# Patient Record
Sex: FEMALE | Race: WHITE | NOT HISPANIC OR LATINO | Employment: OTHER | URBAN - METROPOLITAN AREA
[De-identification: names, ages, dates, MRNs, and addresses within clinical notes are randomized per-mention and may not be internally consistent; named-entity substitution may affect disease eponyms.]

---

## 2023-04-12 ENCOUNTER — TELEMEDICINE (OUTPATIENT)
Dept: PRIMARY CARE | Facility: CLINIC | Age: 83
End: 2023-04-12
Payer: MEDICARE

## 2023-04-12 DIAGNOSIS — L30.9 DERMATITIS: Primary | ICD-10-CM

## 2023-04-12 PROCEDURE — 99442 PR PHYS/QHP TELEPHONE EVALUATION 11-20 MIN: CPT | Performed by: INTERNAL MEDICINE

## 2023-04-12 RX ORDER — FAMOTIDINE 20 MG/1
20 TABLET, FILM COATED ORAL 2 TIMES DAILY
Qty: 14 TABLET | Refills: 1 | Status: SHIPPED | OUTPATIENT
Start: 2023-04-12 | End: 2023-05-05 | Stop reason: WASHOUT

## 2023-04-12 RX ORDER — METHYLPREDNISOLONE 4 MG/1
TABLET ORAL
Qty: 21 TABLET | Refills: 0 | Status: SHIPPED | OUTPATIENT
Start: 2023-04-12 | End: 2023-04-19

## 2023-04-12 RX ORDER — HYDROXYZINE HYDROCHLORIDE 10 MG/1
10 TABLET, FILM COATED ORAL DAILY
Qty: 7 TABLET | Refills: 1 | Status: SHIPPED | OUTPATIENT
Start: 2023-04-12 | End: 2023-05-05 | Stop reason: WASHOUT

## 2023-04-12 NOTE — PROGRESS NOTES
Subjective   Patient ID: Lorenza Mao is a 82 y.o. female who presents for No chief complaint on file..    HPI patient is attended by phone visit because she is complaining of rash which she noticed yesterday on her chest wall.  She denies any fever, tick bite, chills, palpitation and diaphoresis.  She has history of plantar wart of right foot,carotid artery disorder, right mandibular cavernous hemangioma, mild memory loss, allergic rhinitis, coronary atherosclerosis, hypertension, hypothyroidism, dyslipidemia, right frontal lobe meningioma measuring 7 mm, glaucoma, status post COVID-19 infection in November 2019, syncope,anxiety with mild depression, lumbar radiculitis, questionable for Ehler Danlos syndrome and osteoporosis . She is also here for     Review of Systems   Constitutional: Negative.    HENT: Negative.     Eyes: Negative.    Respiratory: Negative.     Cardiovascular: Negative.    Gastrointestinal: Negative.    Endocrine: Negative.    Genitourinary: Negative.    Musculoskeletal: Negative.    Skin:  Positive for rash.   Allergic/Immunologic: Negative.    Neurological: Negative.    Hematological: Negative.    Psychiatric/Behavioral: Negative.         Objective   There were no vitals taken for this visit.    Physical Examnot done    Assessment/Plan    patient is given trial of Medrol Dosepak famotidine and hydroxyzine regarding her rash.  She will continue other medications and will notify the clinic if she has no improvement in her symptoms.

## 2023-04-15 ASSESSMENT — ENCOUNTER SYMPTOMS
ENDOCRINE NEGATIVE: 1
RESPIRATORY NEGATIVE: 1
MUSCULOSKELETAL NEGATIVE: 1
CONSTITUTIONAL NEGATIVE: 1
HEMATOLOGIC/LYMPHATIC NEGATIVE: 1
NEUROLOGICAL NEGATIVE: 1
ALLERGIC/IMMUNOLOGIC NEGATIVE: 1
CARDIOVASCULAR NEGATIVE: 1
PSYCHIATRIC NEGATIVE: 1
GASTROINTESTINAL NEGATIVE: 1
EYES NEGATIVE: 1

## 2023-04-29 DIAGNOSIS — M81.8 ADULT IDIOPATHIC GENERALIZED OSTEOPOROSIS: Primary | ICD-10-CM

## 2023-04-29 DIAGNOSIS — G47.00 INSOMNIA, UNSPECIFIED TYPE: ICD-10-CM

## 2023-04-30 RX ORDER — TRAZODONE HYDROCHLORIDE 50 MG/1
TABLET ORAL
Qty: 90 TABLET | Refills: 0 | Status: SHIPPED | OUTPATIENT
Start: 2023-04-30 | End: 2023-05-05 | Stop reason: SDUPTHER

## 2023-04-30 RX ORDER — ALENDRONATE SODIUM 70 MG/1
TABLET ORAL
Qty: 12 TABLET | Refills: 0 | Status: SHIPPED | OUTPATIENT
Start: 2023-04-30 | End: 2023-09-28 | Stop reason: SDUPTHER

## 2023-05-04 DIAGNOSIS — F41.9 ANXIETY: Primary | ICD-10-CM

## 2023-05-04 RX ORDER — PAROXETINE HYDROCHLORIDE 20 MG/1
1 TABLET, FILM COATED ORAL DAILY
COMMUNITY
Start: 2018-10-25 | End: 2023-05-04 | Stop reason: SDUPTHER

## 2023-05-04 RX ORDER — PAROXETINE HYDROCHLORIDE 20 MG/1
20 TABLET, FILM COATED ORAL DAILY
Qty: 90 TABLET | Refills: 1 | Status: SHIPPED | OUTPATIENT
Start: 2023-05-04 | End: 2023-08-27 | Stop reason: SDUPTHER

## 2023-05-05 ENCOUNTER — OFFICE VISIT (OUTPATIENT)
Dept: PRIMARY CARE | Facility: CLINIC | Age: 83
End: 2023-05-05
Payer: MEDICARE

## 2023-05-05 VITALS
SYSTOLIC BLOOD PRESSURE: 155 MMHG | BODY MASS INDEX: 18.22 KG/M2 | DIASTOLIC BLOOD PRESSURE: 80 MMHG | OXYGEN SATURATION: 98 % | HEIGHT: 62 IN | WEIGHT: 99 LBS

## 2023-05-05 DIAGNOSIS — R07.81 RIB PAIN ON LEFT SIDE: Primary | ICD-10-CM

## 2023-05-05 DIAGNOSIS — G47.00 INSOMNIA, UNSPECIFIED TYPE: ICD-10-CM
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
## 2023-05-05 PROCEDURE — 99213 OFFICE O/P EST LOW 20 MIN: CPT | Performed by: INTERNAL MEDICINE

## 2023-05-05 RX ORDER — ROSUVASTATIN CALCIUM 20 MG/1
TABLET, COATED ORAL
COMMUNITY
Start: 2022-08-15 | End: 2023-12-11 | Stop reason: SDUPTHER

## 2023-05-05 RX ORDER — TRAZODONE HYDROCHLORIDE 50 MG/1
50 TABLET ORAL NIGHTLY
Qty: 90 TABLET | Refills: 1 | Status: SHIPPED | OUTPATIENT
Start: 2023-05-05 | End: 2024-02-22 | Stop reason: WASHOUT

## 2023-05-05 RX ORDER — LIDOCAINE 50 MG/G
1 PATCH TOPICAL DAILY
Qty: 30 PATCH | Refills: 1 | Status: SHIPPED | OUTPATIENT
Start: 2023-05-05 | End: 2023-06-04

## 2023-05-05 ASSESSMENT — ENCOUNTER SYMPTOMS
GASTROINTESTINAL NEGATIVE: 1
COUGH: 1
CARDIOVASCULAR NEGATIVE: 1
SHORTNESS OF BREATH: 1
CONSTITUTIONAL NEGATIVE: 1
MUSCULOSKELETAL NEGATIVE: 1
EYES NEGATIVE: 1
WHEEZING: 1
PSYCHIATRIC NEGATIVE: 1
HEMATOLOGIC/LYMPHATIC NEGATIVE: 1
NEUROLOGICAL NEGATIVE: 1
ALLERGIC/IMMUNOLOGIC NEGATIVE: 1
ENDOCRINE NEGATIVE: 1

## 2023-05-05 ASSESSMENT — PATIENT HEALTH QUESTIONNAIRE - PHQ9
2. FEELING DOWN, DEPRESSED OR HOPELESS: NOT AT ALL
1. LITTLE INTEREST OR PLEASURE IN DOING THINGS: NOT AT ALL
SUM OF ALL RESPONSES TO PHQ9 QUESTIONS 1 AND 2: 0

## 2023-05-05 ASSESSMENT — LIFESTYLE VARIABLES
HOW OFTEN DO YOU HAVE SIX OR MORE DRINKS ON ONE OCCASION: NEVER
AUDIT-C TOTAL SCORE: 0
HOW OFTEN DO YOU HAVE A DRINK CONTAINING ALCOHOL: NEVER
HOW MANY STANDARD DRINKS CONTAINING ALCOHOL DO YOU HAVE ON A TYPICAL DAY: PATIENT DOES NOT DRINK
SKIP TO QUESTIONS 9-10: 1

## 2023-05-05 ASSESSMENT — COLUMBIA-SUICIDE SEVERITY RATING SCALE - C-SSRS: 1. IN THE PAST MONTH, HAVE YOU WISHED YOU WERE DEAD OR WISHED YOU COULD GO TO SLEEP AND NOT WAKE UP?: NO

## 2023-05-05 NOTE — PROGRESS NOTES
"Subjective   Patient ID: Lorenza Mao is a 82 y.o. female who presents for Fall.    HPI     Review of Systems    Objective   /80   Ht 1.575 m (5' 2\")   Wt (!) 44.9 kg (99 lb)   SpO2 98%   BMI 18.11 kg/m²     Physical Exam    Assessment/Plan          "

## 2023-05-05 NOTE — PROGRESS NOTES
"Subjective   Patient ID: Lorenza Mao is a 82 y.o. female who presents for Fall.    HPI patient presents to clinic complaining of left-sided rib pain for the past 4 weeks.  She tripped on a sidewalk 4 weeks ago and landed on her left side of her rib.  She denies any loss of consciousness, headache, shortness of breath, bruising and palpitation.  She has  history of plantar wart of right foot,carotid artery disorder, right mandibular cavernous hemangioma, mild memory loss, allergic rhinitis, coronary atherosclerosis, hypertension, hypothyroidism, dyslipidemia, right frontal lobe meningioma measuring 7 mm, glaucoma, status post COVID-19 infection in November 2019, syncope,anxiety with mild depression, lumbar radiculitis, questionable for Ehler Danlos syndrome and osteoporosis     Review of Systems   Constitutional: Negative.    HENT: Negative.     Eyes: Negative.    Respiratory:  Positive for cough, shortness of breath and wheezing.    Cardiovascular: Negative.    Gastrointestinal: Negative.    Endocrine: Negative.    Genitourinary: Negative.    Musculoskeletal: Negative.    Skin: Negative.    Allergic/Immunologic: Negative.    Neurological: Negative.    Hematological: Negative.    Psychiatric/Behavioral: Negative.         Objective   /80   Ht 1.575 m (5' 2\")   Wt (!) 44.9 kg (99 lb)   SpO2 98%   BMI 18.11 kg/m²     Physical Exam  Constitutional:       Appearance: Normal appearance. She is normal weight.   HENT:      Right Ear: Tympanic membrane normal.      Left Ear: Tympanic membrane and ear canal normal.      Nose: Nose normal.   Neck:      Vascular: No carotid bruit.   Cardiovascular:      Rate and Rhythm: Normal rate.   Pulmonary:      Effort: Pulmonary effort is normal. No respiratory distress.      Breath sounds: No stridor. Wheezing present.   Chest:      Chest wall: No tenderness.   Abdominal:      Palpations: Abdomen is soft.      Tenderness: There is no guarding or rebound.   Skin:     " Coloration: Skin is not jaundiced.   Neurological:      General: No focal deficit present.      Mental Status: She is alert and oriented to person, place, and time.   Psychiatric:         Mood and Affect: Mood normal.       Assessment/Plan    patient is started on lidocaine patch regarding left-sided rib pain and will be scheduled for x-ray of the ribs to rule out any fracture.  She will be notified about x-ray results and will make further recommendations.

## 2023-05-06 DIAGNOSIS — I10 HYPERTENSION, UNSPECIFIED TYPE: Primary | ICD-10-CM

## 2023-05-06 RX ORDER — METOPROLOL SUCCINATE 25 MG/1
TABLET, EXTENDED RELEASE ORAL
Qty: 90 TABLET | Refills: 1 | Status: SHIPPED | OUTPATIENT
Start: 2023-05-06 | End: 2024-02-27 | Stop reason: SDUPTHER

## 2023-06-07 DIAGNOSIS — Z12.31 ENCOUNTER FOR SCREENING MAMMOGRAM FOR MALIGNANT NEOPLASM OF BREAST: Primary | ICD-10-CM

## 2023-06-28 ENCOUNTER — TELEPHONE (OUTPATIENT)
Dept: PRIMARY CARE | Facility: CLINIC | Age: 83
End: 2023-06-28
Payer: MEDICARE

## 2023-06-29 DIAGNOSIS — R29.6 FREQUENT FALLS: Primary | ICD-10-CM

## 2023-07-20 ENCOUNTER — TELEPHONE (OUTPATIENT)
Dept: PRIMARY CARE | Facility: CLINIC | Age: 83
End: 2023-07-20
Payer: MEDICARE

## 2023-07-20 DIAGNOSIS — R41.3 MEMORY LOSS: Primary | ICD-10-CM

## 2023-07-28 ENCOUNTER — TELEPHONE (OUTPATIENT)
Dept: PRIMARY CARE | Facility: CLINIC | Age: 83
End: 2023-07-28
Payer: MEDICARE

## 2023-07-31 ENCOUNTER — TELEPHONE (OUTPATIENT)
Dept: PRIMARY CARE | Facility: CLINIC | Age: 83
End: 2023-07-31
Payer: MEDICARE

## 2023-07-31 NOTE — TELEPHONE ENCOUNTER
Has fallen about 5 times and hurt her neck and head and she is asking if she could have an MRI of her brain done

## 2023-08-02 ENCOUNTER — OFFICE VISIT (OUTPATIENT)
Dept: PRIMARY CARE | Facility: CLINIC | Age: 83
End: 2023-08-02
Payer: MEDICARE

## 2023-08-02 VITALS
TEMPERATURE: 98.9 F | RESPIRATION RATE: 16 BRPM | SYSTOLIC BLOOD PRESSURE: 160 MMHG | DIASTOLIC BLOOD PRESSURE: 63 MMHG | HEIGHT: 61 IN | HEART RATE: 73 BPM | BODY MASS INDEX: 18.31 KG/M2 | WEIGHT: 97 LBS | OXYGEN SATURATION: 94 %

## 2023-08-02 DIAGNOSIS — R29.6 FREQUENT FALLS: ICD-10-CM

## 2023-08-02 DIAGNOSIS — R41.3 MEMORY LOSS: Primary | ICD-10-CM

## 2023-08-02 PROCEDURE — 99213 OFFICE O/P EST LOW 20 MIN: CPT | Performed by: INTERNAL MEDICINE

## 2023-08-02 ASSESSMENT — MINI MENTAL STATE EXAM
HAND THE PERSON A PENCIL AND PAPER. SAY:  WRITE ANY COMPLETE SENTENCE ON THAT PIECE OF PAPER. (NOTE: THE SENTENCE MUST MAKE SENSE.  IGNORE SPELLING ERRORS): 1 CORRECT
SUM ALL MMSE QUESTIONS FOR TOTAL SCORE [OUT OF 30].: 30
WHAT STATE, COUNTRY, CITY, HOSPITAL, FLOOR: 5 CORRECT
SAY: I WOULD LIKE YOU TO REPEAT THIS PHRASE AFTER ME: NO IFS, ANDS, OR BUTS.: 1 CORRECT
SPELL THE WORD WORLD FORWARD AND BACKWARDS OR SERIAL 7S: 5 CORRECT
WHAT IS THE YEAR, SEASON, DATE, DAY, AND MONTH: 5 CORRECT
NAME OR REPEAT 3 OBJECTS - (APPLE, TABLE, PENNY) OR (BALL, TREE, FLAG): 3 CORRECT
RECALL THE 3 OBJECTS FROM ABOVE (APPLE, TABLE, PENNY) OR (BALL, TREE, FLAG): 3 CORRECT
SAY:  READ THE WORDS ON THE PAGE AND THEN DO WHAT IT SAYS.  THEN HAND THE PERSON THE SHEET WITH CLOSE YOUR EYES ON IT.  IF THE SUBJECT READS AND DOES NOT CLOSE THEIR EYES, REPEAT UP TO THREE TIMES.  SCORE ONLY IF SUBJECT CLOSES EYES.: 3 CORRECT
SHOW: PENCIL [OBJECT] ASK: WHAT IS THIS CALLED?: 2 CORRECT
PLEASE COPY THIS PICTURE (NOTE ALL 10 ANGLES MUST BE PRESENT AND TWO MUST INTERSECT): 1 CORRECT
PLACE DESIGN, ERASER AND PENCIL IN FRONT OF THE PERSON.  SAY:  COPY THIS DESIGN PLEASE.  SHOW: DESIGN. ALLOW: MULTIPLE TRIES. WAIT UNTIL PERSON IS FINISHED AND HANDS IT BACK. SCORE: ONLY FOR DIAGRAM WITH 4-SIDED FIGURE BETWEEN TWO 5-SIDED FIGURES: 1 CORRECT

## 2023-08-02 ASSESSMENT — PATIENT HEALTH QUESTIONNAIRE - PHQ9
1. LITTLE INTEREST OR PLEASURE IN DOING THINGS: NOT AT ALL
SUM OF ALL RESPONSES TO PHQ9 QUESTIONS 1 AND 2: 0
2. FEELING DOWN, DEPRESSED OR HOPELESS: NOT AT ALL

## 2023-08-02 ASSESSMENT — ENCOUNTER SYMPTOMS
LOSS OF SENSATION IN FEET: 0
DEPRESSION: 0
OCCASIONAL FEELINGS OF UNSTEADINESS: 1

## 2023-08-02 ASSESSMENT — COLUMBIA-SUICIDE SEVERITY RATING SCALE - C-SSRS
2. HAVE YOU ACTUALLY HAD ANY THOUGHTS OF KILLING YOURSELF?: NO
1. IN THE PAST MONTH, HAVE YOU WISHED YOU WERE DEAD OR WISHED YOU COULD GO TO SLEEP AND NOT WAKE UP?: NO
6. HAVE YOU EVER DONE ANYTHING, STARTED TO DO ANYTHING, OR PREPARED TO DO ANYTHING TO END YOUR LIFE?: NO

## 2023-08-02 NOTE — PROGRESS NOTES
"Subjective   Patient ID: Lorenza Mao is a 83 y.o. female who presents for Fall.    HPI patient presents to clinic complaining of some memory loss over the past few years.  She is also experiencing frequent falls over the last several months.  Her last fall was last month when she fell and landed on her left side of her head.  She denies any loss of consciousness, syncope, nausea, vomiting, chest pain and shortness of breath.  Her symptoms are associated with mild occipital headache.  She has history of plantar wart of right foot,carotid artery disorder, right mandibular cavernous hemangioma, mild memory loss, allergic rhinitis, coronary atherosclerosis, hypertension, hypothyroidism, dyslipidemia, right frontal lobe meningioma measuring 7 mm, glaucoma, status post COVID-19 infection in November 2019, syncope,anxiety with mild depression, lumbar radiculitis, questionable for Ehler Danlos syndrome and osteoporosis she scored 30 out of 30 on her Mini-Mental exam.  She is also noticed to have elevated blood pressure of 160/63.       Review of Systems   Constitutional: Negative.    HENT: Negative.     Eyes: Negative.    Respiratory: Negative.     Cardiovascular: Negative.    Gastrointestinal: Negative.    Endocrine: Negative.    Genitourinary: Negative.    Musculoskeletal: Negative.    Skin: Negative.    Allergic/Immunologic: Negative.    Neurological: Negative.         Falls and memory loss   Hematological: Negative.    Psychiatric/Behavioral: Negative.         Objective   /63   Pulse 73   Temp 37.2 °C (98.9 °F)   Resp 16   Ht 1.549 m (5' 1\")   Wt (!) 44 kg (97 lb)   SpO2 94%   BMI 18.33 kg/m²     Physical Exam  Constitutional:       Appearance: Normal appearance. She is normal weight.   HENT:      Right Ear: Tympanic membrane normal.      Left Ear: Tympanic membrane and ear canal normal.      Nose: Nose normal.   Neck:      Vascular: No carotid bruit.   Cardiovascular:      Rate and Rhythm: Normal " rate.   Pulmonary:      Effort: No respiratory distress.      Breath sounds: No stridor. No wheezing.   Abdominal:      Palpations: Abdomen is soft.      Tenderness: There is no abdominal tenderness. There is no guarding or rebound.   Skin:     Coloration: Skin is not jaundiced.   Neurological:      General: No focal deficit present.      Mental Status: She is alert and oriented to person, place, and time.   Psychiatric:         Mood and Affect: Mood normal.         Assessment/Plan    patient will be scheduled for CT of the head without contrast to rule out any subdural hematoma in view of frequent falls.  She will be referred to neurology clinic regarding frequent falls.  She is advised to increase metoprolol succinate to 50 mg daily in view of uncontrolled hypertension.  She will be notified about her CT results and will make further recommendations.

## 2023-08-03 ENCOUNTER — TELEPHONE (OUTPATIENT)
Dept: PRIMARY CARE | Facility: CLINIC | Age: 83
End: 2023-08-03
Payer: MEDICARE

## 2023-08-03 DIAGNOSIS — Z78.0 MENOPAUSE: Primary | ICD-10-CM

## 2023-08-05 ASSESSMENT — ENCOUNTER SYMPTOMS
CONSTITUTIONAL NEGATIVE: 1
PSYCHIATRIC NEGATIVE: 1
ENDOCRINE NEGATIVE: 1
GASTROINTESTINAL NEGATIVE: 1
NEUROLOGICAL NEGATIVE: 1
MUSCULOSKELETAL NEGATIVE: 1
HEMATOLOGIC/LYMPHATIC NEGATIVE: 1
CARDIOVASCULAR NEGATIVE: 1
ALLERGIC/IMMUNOLOGIC NEGATIVE: 1
EYES NEGATIVE: 1
RESPIRATORY NEGATIVE: 1

## 2023-08-11 ENCOUNTER — TELEPHONE (OUTPATIENT)
Dept: PRIMARY CARE | Facility: CLINIC | Age: 83
End: 2023-08-11
Payer: MEDICARE

## 2023-08-17 ENCOUNTER — HOSPITAL ENCOUNTER (OUTPATIENT)
Dept: DATA CONVERSION | Facility: HOSPITAL | Age: 83
Discharge: HOME | End: 2023-08-17
Payer: MEDICARE

## 2023-08-17 DIAGNOSIS — Z78.0 ASYMPTOMATIC MENOPAUSAL STATE: ICD-10-CM

## 2023-08-27 DIAGNOSIS — F41.9 ANXIETY: ICD-10-CM

## 2023-08-27 RX ORDER — PAROXETINE HYDROCHLORIDE 20 MG/1
20 TABLET, FILM COATED ORAL DAILY
Qty: 90 TABLET | Refills: 1 | Status: SHIPPED | OUTPATIENT
Start: 2023-08-27 | End: 2023-12-11 | Stop reason: SDUPTHER

## 2023-09-28 DIAGNOSIS — M81.8 ADULT IDIOPATHIC GENERALIZED OSTEOPOROSIS: ICD-10-CM

## 2023-09-28 RX ORDER — ALENDRONATE SODIUM 70 MG/1
70 TABLET ORAL
Qty: 12 TABLET | Refills: 0 | Status: SHIPPED | OUTPATIENT
Start: 2023-09-28 | End: 2024-01-15 | Stop reason: SDUPTHER

## 2023-10-18 ENCOUNTER — LAB (OUTPATIENT)
Dept: LAB | Facility: LAB | Age: 83
End: 2023-10-18
Payer: MEDICARE
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
## 2023-10-18 LAB
ALBUMIN SERPL BCP-MCNC: 4.3 G/DL (ref 3.4–5)
ALP SERPL-CCNC: 66 U/L (ref 33–136)
ALT SERPL W P-5'-P-CCNC: 10 U/L (ref 7–45)
AMMONIA PLAS-SCNC: 43 UMOL/L (ref 16–53)
ANION GAP SERPL CALC-SCNC: 14 MMOL/L (ref 10–20)
AST SERPL W P-5'-P-CCNC: 18 U/L (ref 9–39)
BASOPHILS # BLD AUTO: 0.04 X10*3/UL (ref 0–0.1)
BASOPHILS NFR BLD AUTO: 0.6 %
BILIRUB SERPL-MCNC: 0.4 MG/DL (ref 0–1.2)
BUN SERPL-MCNC: 25 MG/DL (ref 6–23)
CALCIUM SERPL-MCNC: 9.8 MG/DL (ref 8.6–10.6)
CHLORIDE SERPL-SCNC: 104 MMOL/L (ref 98–107)
CO2 SERPL-SCNC: 29 MMOL/L (ref 21–32)
CREAT SERPL-MCNC: 0.91 MG/DL (ref 0.5–1.05)
EOSINOPHIL # BLD AUTO: 0.21 X10*3/UL (ref 0–0.4)
EOSINOPHIL NFR BLD AUTO: 3.3 %
ERYTHROCYTE [DISTWIDTH] IN BLOOD BY AUTOMATED COUNT: 13.2 % (ref 11.5–14.5)
ERYTHROCYTE [SEDIMENTATION RATE] IN BLOOD BY WESTERGREN METHOD: 23 MM/H (ref 0–30)
GFR SERPL CREATININE-BSD FRML MDRD: 63 ML/MIN/1.73M*2
GLUCOSE SERPL-MCNC: 100 MG/DL (ref 74–99)
HCT VFR BLD AUTO: 39.1 % (ref 36–46)
HGB BLD-MCNC: 12.5 G/DL (ref 12–16)
IMM GRANULOCYTES # BLD AUTO: 0.02 X10*3/UL (ref 0–0.5)
IMM GRANULOCYTES NFR BLD AUTO: 0.3 % (ref 0–0.9)
LYMPHOCYTES # BLD AUTO: 1.05 X10*3/UL (ref 0.8–3)
LYMPHOCYTES NFR BLD AUTO: 16.4 %
MCH RBC QN AUTO: 31.7 PG (ref 26–34)
MCHC RBC AUTO-ENTMCNC: 32 G/DL (ref 32–36)
MCV RBC AUTO: 99 FL (ref 80–100)
MONOCYTES # BLD AUTO: 0.46 X10*3/UL (ref 0.05–0.8)
MONOCYTES NFR BLD AUTO: 7.2 %
NEUTROPHILS # BLD AUTO: 4.62 X10*3/UL (ref 1.6–5.5)
NEUTROPHILS NFR BLD AUTO: 72.2 %
NRBC BLD-RTO: 0 /100 WBCS (ref 0–0)
PLATELET # BLD AUTO: 223 X10*3/UL (ref 150–450)
PMV BLD AUTO: 11.7 FL (ref 7.5–11.5)
POTASSIUM SERPL-SCNC: 4.7 MMOL/L (ref 3.5–5.3)
PROT SERPL-MCNC: 7 G/DL (ref 6.4–8.2)
RBC # BLD AUTO: 3.94 X10*6/UL (ref 4–5.2)
SODIUM SERPL-SCNC: 142 MMOL/L (ref 136–145)
T PALLIDUM AB SER QL: NONREACTIVE
VIT B12 SERPL-MCNC: 679 PG/ML (ref 211–911)
WBC # BLD AUTO: 6.4 X10*3/UL (ref 4.4–11.3)

## 2023-10-18 PROCEDURE — 36415 COLL VENOUS BLD VENIPUNCTURE: CPT

## 2023-10-18 PROCEDURE — 82140 ASSAY OF AMMONIA: CPT

## 2023-10-18 PROCEDURE — 80053 COMPREHEN METABOLIC PANEL: CPT

## 2023-10-18 PROCEDURE — 85025 COMPLETE CBC W/AUTO DIFF WBC: CPT

## 2023-10-18 PROCEDURE — 86780 TREPONEMA PALLIDUM: CPT

## 2023-10-18 PROCEDURE — 85652 RBC SED RATE AUTOMATED: CPT

## 2023-10-18 PROCEDURE — 82607 VITAMIN B-12: CPT

## 2023-11-08 ENCOUNTER — APPOINTMENT (OUTPATIENT)
Dept: PRIMARY CARE | Facility: CLINIC | Age: 83
End: 2023-11-08
Payer: MEDICARE

## 2023-11-15 ENCOUNTER — APPOINTMENT (OUTPATIENT)
Dept: PRIMARY CARE | Facility: CLINIC | Age: 83
End: 2023-11-15
Payer: MEDICARE

## 2023-11-29 ENCOUNTER — OFFICE VISIT (OUTPATIENT)
Dept: PRIMARY CARE | Facility: CLINIC | Age: 83
End: 2023-11-29
Payer: MEDICARE

## 2023-11-29 VITALS
WEIGHT: 103 LBS | OXYGEN SATURATION: 93 % | SYSTOLIC BLOOD PRESSURE: 128 MMHG | DIASTOLIC BLOOD PRESSURE: 56 MMHG | HEART RATE: 66 BPM | BODY MASS INDEX: 19.45 KG/M2 | HEIGHT: 61 IN

## 2023-11-29 DIAGNOSIS — G31.84 MILD COGNITIVE IMPAIRMENT: ICD-10-CM

## 2023-11-29 DIAGNOSIS — E78.5 DYSLIPIDEMIA: ICD-10-CM

## 2023-11-29 DIAGNOSIS — Z00.00 ROUTINE GENERAL MEDICAL EXAMINATION AT HEALTH CARE FACILITY: Primary | ICD-10-CM

## 2023-11-29 DIAGNOSIS — Z13.6 SCREENING FOR CARDIOVASCULAR CONDITION: ICD-10-CM

## 2023-11-29 DIAGNOSIS — I77.9 CAROTID ARTERY DISORDER (CMS-HCC): ICD-10-CM

## 2023-11-29 DIAGNOSIS — F41.9 ANXIETY DISORDER, UNSPECIFIED TYPE: ICD-10-CM

## 2023-11-29 DIAGNOSIS — I10 HYPERTENSION, UNSPECIFIED TYPE: ICD-10-CM

## 2023-11-29 PROBLEM — D32.0 BENIGN MENINGIOMA OF BRAIN (MULTI): Status: ACTIVE | Noted: 2023-11-29

## 2023-11-29 PROCEDURE — 93000 ELECTROCARDIOGRAM COMPLETE: CPT | Performed by: INTERNAL MEDICINE

## 2023-11-29 PROCEDURE — G0439 PPPS, SUBSEQ VISIT: HCPCS | Performed by: INTERNAL MEDICINE

## 2023-11-29 PROCEDURE — 3078F DIAST BP <80 MM HG: CPT | Performed by: INTERNAL MEDICINE

## 2023-11-29 PROCEDURE — 1160F RVW MEDS BY RX/DR IN RCRD: CPT | Performed by: INTERNAL MEDICINE

## 2023-11-29 PROCEDURE — 1159F MED LIST DOCD IN RCRD: CPT | Performed by: INTERNAL MEDICINE

## 2023-11-29 PROCEDURE — 1170F FXNL STATUS ASSESSED: CPT | Performed by: INTERNAL MEDICINE

## 2023-11-29 PROCEDURE — 3074F SYST BP LT 130 MM HG: CPT | Performed by: INTERNAL MEDICINE

## 2023-11-29 PROCEDURE — 1036F TOBACCO NON-USER: CPT | Performed by: INTERNAL MEDICINE

## 2023-11-29 ASSESSMENT — PATIENT HEALTH QUESTIONNAIRE - PHQ9
2. FEELING DOWN, DEPRESSED OR HOPELESS: NOT AT ALL
SUM OF ALL RESPONSES TO PHQ9 QUESTIONS 1 AND 2: 0
1. LITTLE INTEREST OR PLEASURE IN DOING THINGS: NOT AT ALL
2. FEELING DOWN, DEPRESSED OR HOPELESS: NOT AT ALL
1. LITTLE INTEREST OR PLEASURE IN DOING THINGS: NOT AT ALL
SUM OF ALL RESPONSES TO PHQ9 QUESTIONS 1 AND 2: 0

## 2023-11-29 ASSESSMENT — ACTIVITIES OF DAILY LIVING (ADL)
DOING_HOUSEWORK: INDEPENDENT
MANAGING_FINANCES: INDEPENDENT
BATHING: INDEPENDENT
DRESSING: INDEPENDENT
GROCERY_SHOPPING: INDEPENDENT
TAKING_MEDICATION: INDEPENDENT

## 2023-11-29 ASSESSMENT — ENCOUNTER SYMPTOMS: DEPRESSION: 0

## 2023-11-29 NOTE — PROGRESS NOTES
"Subjective   Reason for Visit: Lorenza Mao is an 83 y.o. female here for a Medicare Wellness visit.     Past Medical, Surgical, and Family History reviewed and updated in chart.    Reviewed all medications by prescribing practitioner or clinical pharmacist (such as prescriptions, OTCs, herbal therapies and supplements) and documented in the medical record.    HPI, patient presents to clinic for Medicare annual wellness exam.  She has history of mild memory loss, allergic rhinitis, coronary atherosclerosis, hypertension, hypothyroidism, dyslipidemia, Aortic ,mitral and tricuspid regurgitation, right frontal lobe meningioma measuring 7 mm, glaucoma, status post COVID-19 infection in November 2019, syncope,anxiety with mild depression, lumbar radiculitis, questionable for Ehler Danlos syndrome and osteoporosis  She is doing well and denies any headache, recent falls, chest pain, shortness of breath abdominal pain and palpitation.  She had laboratory studies done at Dr. Nunes's office which revealed normal ammonia level, hemoglobin of 12.5, serum glucose of 100 BUN of 25 and other studies were unremarkable.  EKG done shows sinus rhythm with marked sinus arrhythmia at 63 bpm with normal axis normal interval with nonspecific T wave changes.      Patient Care Team:  Andrade Carrillo MD as PCP - General  Ish Ledbetter MD (Inactive) (Internal Medicine)     Review of Systems   Constitutional: Negative.    HENT: Negative.     Eyes: Negative.    Respiratory: Negative.     Cardiovascular: Negative.    Gastrointestinal: Negative.    Endocrine: Negative.    Genitourinary: Negative.    Musculoskeletal: Negative.    Skin: Negative.    Allergic/Immunologic: Negative.    Neurological: Negative.    Hematological: Negative.    Psychiatric/Behavioral: Negative.         Objective   Vitals:  /56   Pulse 66   Ht 1.549 m (5' 1\")   Wt 46.7 kg (103 lb)   SpO2 93%   BMI 19.46 kg/m²       Physical Exam  Constitutional: "       Appearance: Normal appearance. She is normal weight.   HENT:      Right Ear: Tympanic membrane normal.      Left Ear: Tympanic membrane and ear canal normal.      Nose: Nose normal.   Neck:      Vascular: No carotid bruit.   Cardiovascular:      Rate and Rhythm: Normal rate.   Pulmonary:      Effort: No respiratory distress.      Breath sounds: No stridor. No wheezing.   Abdominal:      Palpations: Abdomen is soft.      Tenderness: There is no guarding or rebound.   Skin:     Coloration: Skin is not jaundiced.   Neurological:      General: No focal deficit present.      Mental Status: She is alert and oriented to person, place, and time.   Psychiatric:         Mood and Affect: Mood normal.         Assessment/Plan   Problem List Items Addressed This Visit    None    Patient will be scheduled for CT cardiac scoring regarding screening for cardiovascular disease.  She will continue to follow-up with neurology clinic regarding her mild neurocognitive disorder with Dr. Nunes.  She will return to clinic in 3 months for follow-up visit.

## 2023-12-01 ENCOUNTER — TELEPHONE (OUTPATIENT)
Dept: CARDIOLOGY | Facility: CLINIC | Age: 83
End: 2023-12-01

## 2023-12-02 ASSESSMENT — ENCOUNTER SYMPTOMS
EYES NEGATIVE: 1
GASTROINTESTINAL NEGATIVE: 1
CONSTITUTIONAL NEGATIVE: 1
CARDIOVASCULAR NEGATIVE: 1
HEMATOLOGIC/LYMPHATIC NEGATIVE: 1
ALLERGIC/IMMUNOLOGIC NEGATIVE: 1
RESPIRATORY NEGATIVE: 1
MUSCULOSKELETAL NEGATIVE: 1
ENDOCRINE NEGATIVE: 1
PSYCHIATRIC NEGATIVE: 1
NEUROLOGICAL NEGATIVE: 1

## 2023-12-09 DIAGNOSIS — E78.5 DYSLIPIDEMIA: Primary | ICD-10-CM

## 2023-12-09 DIAGNOSIS — F41.9 ANXIETY: ICD-10-CM

## 2023-12-09 DIAGNOSIS — E03.9 HYPOTHYROIDISM (ACQUIRED): ICD-10-CM

## 2023-12-11 RX ORDER — LEVOTHYROXINE SODIUM 75 UG/1
75 TABLET ORAL DAILY
Qty: 90 TABLET | Refills: 0 | Status: SHIPPED | OUTPATIENT
Start: 2023-12-11 | End: 2024-04-22 | Stop reason: SDUPTHER

## 2023-12-11 RX ORDER — ROSUVASTATIN CALCIUM 20 MG/1
20 TABLET, COATED ORAL DAILY
Qty: 90 TABLET | Refills: 0 | Status: SHIPPED | OUTPATIENT
Start: 2023-12-11 | End: 2024-02-27 | Stop reason: SDUPTHER

## 2023-12-11 RX ORDER — PAROXETINE HYDROCHLORIDE 20 MG/1
20 TABLET, FILM COATED ORAL DAILY
Qty: 90 TABLET | Refills: 1 | Status: SHIPPED | OUTPATIENT
Start: 2023-12-11 | End: 2024-04-22 | Stop reason: SDUPTHER

## 2023-12-21 ENCOUNTER — HOSPITAL ENCOUNTER (OUTPATIENT)
Dept: RADIOLOGY | Facility: HOSPITAL | Age: 83
End: 2023-12-21

## 2024-01-11 ENCOUNTER — ANCILLARY PROCEDURE (OUTPATIENT)
Dept: RADIOLOGY | Facility: CLINIC | Age: 84
End: 2024-01-11
Payer: MEDICARE

## 2024-01-11 DIAGNOSIS — M54.2 CERVICALGIA: ICD-10-CM

## 2024-01-11 PROCEDURE — 72141 MRI NECK SPINE W/O DYE: CPT

## 2024-01-11 PROCEDURE — 72141 MRI NECK SPINE W/O DYE: CPT | Performed by: RADIOLOGY

## 2024-01-15 DIAGNOSIS — M81.8 ADULT IDIOPATHIC GENERALIZED OSTEOPOROSIS: ICD-10-CM

## 2024-01-15 RX ORDER — ALENDRONATE SODIUM 70 MG/1
70 TABLET ORAL
Qty: 12 TABLET | Refills: 0 | Status: SHIPPED | OUTPATIENT
Start: 2024-01-15 | End: 2024-01-18 | Stop reason: SDUPTHER

## 2024-01-18 DIAGNOSIS — M81.8 ADULT IDIOPATHIC GENERALIZED OSTEOPOROSIS: ICD-10-CM

## 2024-01-18 RX ORDER — ALENDRONATE SODIUM 70 MG/1
70 TABLET ORAL
Qty: 12 TABLET | Refills: 0 | Status: SHIPPED | OUTPATIENT
Start: 2024-01-18

## 2024-01-30 ENCOUNTER — LAB (OUTPATIENT)
Dept: LAB | Facility: LAB | Age: 84
End: 2024-01-30
Payer: MEDICARE

## 2024-01-30 DIAGNOSIS — R41.3 OTHER AMNESIA: Primary | ICD-10-CM

## 2024-01-30 LAB — TSH SERPL-ACNC: 1.54 MIU/L (ref 0.44–3.98)

## 2024-01-30 PROCEDURE — 84443 ASSAY THYROID STIM HORMONE: CPT

## 2024-01-30 PROCEDURE — 36415 COLL VENOUS BLD VENIPUNCTURE: CPT

## 2024-02-05 ENCOUNTER — HOSPITAL ENCOUNTER (OUTPATIENT)
Dept: RADIOLOGY | Facility: CLINIC | Age: 84
Discharge: HOME | End: 2024-02-05
Payer: MEDICARE

## 2024-02-05 ENCOUNTER — APPOINTMENT (OUTPATIENT)
Dept: RADIOLOGY | Facility: CLINIC | Age: 84
End: 2024-02-05
Payer: MEDICARE

## 2024-02-05 DIAGNOSIS — R41.3 OTHER AMNESIA: ICD-10-CM

## 2024-02-05 PROCEDURE — 70551 MRI BRAIN STEM W/O DYE: CPT

## 2024-02-05 PROCEDURE — 70551 MRI BRAIN STEM W/O DYE: CPT | Performed by: RADIOLOGY

## 2024-02-20 NOTE — PROGRESS NOTES
Date of Visit: 02/22/2024  1:45 PM EST  Location of visit: [address]   CARDIOLOGY: Dr Hunt   Chief Complaint:   LORENZA MAO is being seen for a six month follow-up of abnormal test(s) results, coronary artery disease, chest pain, dyslipidemia, hypertension, syncope and a routine medication evaluation.   HPI / Summary:   Lorenza Mao is a 83 y.o. female presents for  a six month Cardiology follow up visit. She denies chest pain, sob, palpitations or pedal edema. Her CT Cardiac Calcium score was 881 in 2017. A NM Stress test was done in 2016. An Echo was done in September last year- see report. Had a MRI brain earlier this month for memory issues.      Medical History:   She has a past medical history of Acute bronchitis, unspecified (11/09/2017), Acute upper respiratory infection, unspecified (10/16/2013), Age-related osteoporosis without current pathological fracture, Body mass index (BMI) 19.9 or less, adult (02/18/2022), Body mass index (BMI) 19.9 or less, adult (07/21/2021), Body mass index (BMI) 19.9 or less, adult (02/12/2021), Encounter for screening mammogram for malignant neoplasm of breast (02/17/2014), Essential (primary) hypertension, Headache, unspecified (06/28/2018), Hypothyroidism, unspecified, Lesion of sciatic nerve, right lower limb (03/14/2016), Other conditions influencing health status, Pain in right hip (10/06/2015), Pain in unspecified foot (06/12/2015), Personal history of other diseases of the musculoskeletal system and connective tissue, Personal history of other diseases of the respiratory system (11/09/2017), Personal history of other endocrine, nutritional and metabolic disease, Personal history of other specified conditions (11/15/2022), Personal history of other specified conditions (09/19/2016), Trochanteric bursitis, right hip (10/06/2015), Unspecified fall, initial encounter (07/21/2021), and Unspecified injury of unspecified foot, initial encounter  (05/09/2015).  Surgical Hx:   She has a past surgical history that includes Hysterectomy (05/14/2013); Shoulder surgery (05/14/2013); Lumbar laminectomy (05/14/2013); Other surgical history (05/14/2013); Other surgical history (01/04/2021); Colonoscopy (08/06/2015); and Esophagogastroduodenoscopy (05/30/2013).   Social Hx:   She reports that she has never smoked. She has never used smokeless tobacco. She reports that she does not currently use alcohol. She reports that she does not use drugs.  Family Hx:   Her family history is not on file.   Allergies:  Allergies   Allergen Reactions    Doxycycline Diarrhea and Other    Nickel Itching    Adhesive Rash    Latex, Natural Rubber Rash    Penicillins Rash     Outpatient Medications:  Current Outpatient Medications   Medication Instructions    alendronate (FOSAMAX) 70 mg, oral, Every 7 days, Take in the morning with a full glass of water, on an empty stomach, and do not take anything else by mouth or lie down for the next 30 min.    levothyroxine (SYNTHROID, LEVOXYL) 75 mcg, oral, Daily, as directed    metoprolol succinate XL (Toprol-XL) 25 mg 24 hr tablet TAKE ONE TABLET BY MOUTH EVERY DAY    PARoxetine (PAXIL) 20 mg, oral, Daily    rosuvastatin (CRESTOR) 20 mg, oral, Daily    traZODone (DESYREL) 50 mg, oral, Nightly       Physical Exam:  There were no vitals filed for this visit.  Wt Readings from Last 5 Encounters:   02/05/24 46.7 kg (103 lb)   01/11/24 46.7 kg (103 lb)   11/29/23 46.7 kg (103 lb)   08/24/23 (!) 44.5 kg (98 lb)   08/02/23 (!) 44 kg (97 lb)     Physical Exam  JVP not elevated. Carotid impulses are 2+ without overlying bruit.   Chest exhibits fair to good air movement with completely clear breath sounds.   The cardiac rhythm is regular with no premature beats.   Normal S1 and S2. No gallop, murmur or rub, or click.   Abdomen is soft and benign without focal tenderness.   With no lower leg edema. The pedal pulses are intact.     Last Labs:  Lab on  01/30/2024   Component Date Value    Thyroid Stimulating Horm* 01/30/2024 1.54    Lab on 10/18/2023   Component Date Value    WBC 10/18/2023 6.4     nRBC 10/18/2023 0.0     RBC 10/18/2023 3.94 (L)     Hemoglobin 10/18/2023 12.5     Hematocrit 10/18/2023 39.1     MCV 10/18/2023 99     MCH 10/18/2023 31.7     MCHC 10/18/2023 32.0     RDW 10/18/2023 13.2     Platelets 10/18/2023 223     MPV 10/18/2023 11.7 (H)     Neutrophils % 10/18/2023 72.2     Immature Granulocytes %,* 10/18/2023 0.3     Lymphocytes % 10/18/2023 16.4     Monocytes % 10/18/2023 7.2     Eosinophils % 10/18/2023 3.3     Basophils % 10/18/2023 0.6     Neutrophils Absolute 10/18/2023 4.62     Immature Granulocytes Ab* 10/18/2023 0.02     Lymphocytes Absolute 10/18/2023 1.05     Monocytes Absolute 10/18/2023 0.46     Eosinophils Absolute 10/18/2023 0.21     Basophils Absolute 10/18/2023 0.04     Glucose 10/18/2023 100 (H)     Sodium 10/18/2023 142     Potassium 10/18/2023 4.7     Chloride 10/18/2023 104     Bicarbonate 10/18/2023 29     Anion Gap 10/18/2023 14     Urea Nitrogen 10/18/2023 25 (H)     Creatinine 10/18/2023 0.91     eGFR 10/18/2023 63     Calcium 10/18/2023 9.8     Albumin 10/18/2023 4.3     Alkaline Phosphatase 10/18/2023 66     Total Protein 10/18/2023 7.0     AST 10/18/2023 18     Bilirubin, Total 10/18/2023 0.4     ALT 10/18/2023 10     Sedimentation Rate 10/18/2023 23     Vitamin B12 10/18/2023 679     Syphilis Total Ab 10/18/2023 Nonreactive     Ammonia 10/18/2023 43         Assessment/Plan   1. Coronary artery disease. This patient did have a surveillance nuclear stress test performed on 02/12/2016 which was negative for any evidence of ischemia. Subsequent to that the patient had a CT coronary scales him score of 881 performed on 02/17/2017.  The patient did have an echocardiogram 9/12/2023 demonstrating an LV ejection fraction of 60-65% moderate mitral valve prolapse 1-2+ mitral valve regurgitation 2+ tricuspid valve regurgitation  2+ aortic valve insufficiency and atrial septal aneurysm.  Will need to monitor serial echoes every 2 to 3 years.  She has no concerning anginal symptoms.     2. Hyperlipidemia. Patient did have a lipid panel on 12/20/2019 with cholesterol 208 HDL 99 LDL 93 triglycerides 76. SMA panel was normal as was the TSH. Given the findings of her CT coronary calcium score the patient simvastatin 40 mg daily will be switched to rosuvastatin 20 mg daily. Repeat FLP done 02/2022 showed chol 202, , LDL 79, trig 72. Will increase rosuvastatin to 40 mg daily.  The patient's most recent lipid panel from 10/25/2022 included cholesterol 194  LDL 77 triglycerides 69 this was prior to the increase in dose of rosuvastatin.     3. Hypertension. Appears to be under adequate control with losartan 50 mg daily.  Recent lab work 10/18/2023 included a normal CBC SMA panel.     4. Arthritis. The patient did have an arthritis panel on 01/14/2020 with sedimentation rate 7 CRP 0.2 and negative rheumatoid factor.     5. Bilateral cataracts.      6. Hx of covid-19 and both vaccines. Had covid-19 in 2020.     7. Presyncope and syncope. Wear Zio patch monitor for 2 weeks and return in six weeks. Zio patch worn September 2021 showed a heart rate of 44-1 84 with an average of 65 bpm. Sinus rhythm. 1 run of VT for 4 beats. 68 SVT runs. The longest lasting 5 beats. Echo done 11/2021 showed EF 60-65%, mild MR, moderate MVP, mild to moderate TR, moderate to severe AI, moderate AZ, atrial septal aneurysm. Saw neuro and EEG and carotid came out OK per notes.    8.  Mechanical fall.  This patient had a mechanical fall tripping on a slab of concrete in 9/2023 injuring the chest wall.  She has been seen by neurology and the question of dementia has been raised but clinically not confirmed.  She did have lab work 10/18/2023 including a normal CBC SMA panel sedimentation rate 23 ammonia level 43 B12 679.  MRI of brain 2/5/2024 showed moderate  parenchymal volume loss no change from 7/21/2022.  She does have a small 6 to 7 mm meningioma located in a parasagittal frontal lobe convexity.  She also had MR of cervical spine 1/11/2024 showing mild to moderate spinal stenosis mild to moderate neuroforaminal stenoses slightly worse from 7/10/2017.           Orders:  No orders of the defined types were placed in this encounter.     Followup Appts:  Future Appointments   Date Time Provider Department [place]   2/22/2024  1:45 PM Phill Hunt MD [id number] [facility]   2/29/2024 10:20 AM Andrade Carrillo MD [id number] [facility]   12/5/2024  2:50 PM Andrade Carrillo MD [id number] [facility]           ____________________________________________________________  Hayley Greenberg RN  [facility]  [facility]

## 2024-02-21 RX ORDER — SUMATRIPTAN 50 MG/1
50 TABLET, FILM COATED ORAL ONCE AS NEEDED
COMMUNITY
Start: 2022-11-23 | End: 2024-02-22 | Stop reason: ALTCHOICE

## 2024-02-21 RX ORDER — PYRIDOXINE HCL (VITAMIN B6) 500 MG
500 TABLET ORAL DAILY
COMMUNITY
End: 2024-02-22 | Stop reason: WASHOUT

## 2024-02-21 RX ORDER — DONEPEZIL HYDROCHLORIDE 5 MG/1
5 TABLET, FILM COATED ORAL NIGHTLY
COMMUNITY
Start: 2024-01-29

## 2024-02-21 RX ORDER — GUAIFENESIN AND PHENYLEPHRINE HCL 400; 10 MG/1; MG/1
1 TABLET ORAL
COMMUNITY
Start: 2018-10-25

## 2024-02-21 RX ORDER — LATANOPROST 50 UG/ML
1 SOLUTION/ DROPS OPHTHALMIC NIGHTLY
COMMUNITY
Start: 2024-01-08

## 2024-02-21 RX ORDER — LOSARTAN POTASSIUM 50 MG/1
50 TABLET ORAL DAILY
COMMUNITY
Start: 2020-02-25 | End: 2024-02-22 | Stop reason: WASHOUT

## 2024-02-21 RX ORDER — CHOLECALCIFEROL (VITAMIN D3) 125 MCG
5 CAPSULE ORAL DAILY
COMMUNITY

## 2024-02-21 RX ORDER — ZINC GLUCONATE 50 MG
1 TABLET ORAL DAILY
COMMUNITY

## 2024-02-22 ENCOUNTER — OFFICE VISIT (OUTPATIENT)
Dept: CARDIOLOGY | Facility: CLINIC | Age: 84
End: 2024-02-22
Payer: MEDICARE

## 2024-02-22 VITALS
BODY MASS INDEX: 19.45 KG/M2 | WEIGHT: 103 LBS | HEART RATE: 64 BPM | SYSTOLIC BLOOD PRESSURE: 152 MMHG | HEIGHT: 61 IN | OXYGEN SATURATION: 97 % | DIASTOLIC BLOOD PRESSURE: 64 MMHG | RESPIRATION RATE: 16 BRPM

## 2024-02-22 DIAGNOSIS — R55 SYNCOPE, UNSPECIFIED SYNCOPE TYPE: ICD-10-CM

## 2024-02-22 DIAGNOSIS — I10 PRIMARY HYPERTENSION: ICD-10-CM

## 2024-02-22 DIAGNOSIS — I25.10 CORONARY ARTERY DISEASE INVOLVING NATIVE CORONARY ARTERY OF NATIVE HEART WITHOUT ANGINA PECTORIS: ICD-10-CM

## 2024-02-22 DIAGNOSIS — R07.9 CHEST PAIN, UNSPECIFIED TYPE: ICD-10-CM

## 2024-02-22 DIAGNOSIS — E78.2 MIXED HYPERLIPIDEMIA: Primary | ICD-10-CM

## 2024-02-22 PROCEDURE — 99214 OFFICE O/P EST MOD 30 MIN: CPT | Performed by: INTERNAL MEDICINE

## 2024-02-22 PROCEDURE — 3078F DIAST BP <80 MM HG: CPT | Performed by: INTERNAL MEDICINE

## 2024-02-22 PROCEDURE — 1036F TOBACCO NON-USER: CPT | Performed by: INTERNAL MEDICINE

## 2024-02-22 PROCEDURE — 3075F SYST BP GE 130 - 139MM HG: CPT | Performed by: INTERNAL MEDICINE

## 2024-02-22 PROCEDURE — 1159F MED LIST DOCD IN RCRD: CPT | Performed by: INTERNAL MEDICINE

## 2024-02-22 RX ORDER — ASPIRIN 81 MG/1
81 TABLET ORAL DAILY
COMMUNITY

## 2024-02-22 ASSESSMENT — PATIENT HEALTH QUESTIONNAIRE - PHQ9
2. FEELING DOWN, DEPRESSED OR HOPELESS: NOT AT ALL
SUM OF ALL RESPONSES TO PHQ9 QUESTIONS 1 AND 2: 0
1. LITTLE INTEREST OR PLEASURE IN DOING THINGS: NOT AT ALL

## 2024-02-22 ASSESSMENT — ENCOUNTER SYMPTOMS: DEPRESSION: 0

## 2024-02-27 DIAGNOSIS — I10 HYPERTENSION, UNSPECIFIED TYPE: ICD-10-CM

## 2024-02-27 DIAGNOSIS — E78.5 DYSLIPIDEMIA: ICD-10-CM

## 2024-02-28 RX ORDER — ROSUVASTATIN CALCIUM 20 MG/1
20 TABLET, COATED ORAL DAILY
Qty: 90 TABLET | Refills: 2 | Status: SHIPPED | OUTPATIENT
Start: 2024-02-28 | End: 2025-02-27

## 2024-02-28 RX ORDER — METOPROLOL SUCCINATE 25 MG/1
25 TABLET, EXTENDED RELEASE ORAL DAILY
Qty: 90 TABLET | Refills: 0 | Status: SHIPPED | OUTPATIENT
Start: 2024-02-28 | End: 2024-04-22 | Stop reason: SDUPTHER

## 2024-02-29 ENCOUNTER — APPOINTMENT (OUTPATIENT)
Dept: PRIMARY CARE | Facility: CLINIC | Age: 84
End: 2024-02-29
Payer: MEDICARE

## 2024-04-22 DIAGNOSIS — E03.9 HYPOTHYROIDISM (ACQUIRED): ICD-10-CM

## 2024-04-22 DIAGNOSIS — I10 HYPERTENSION, UNSPECIFIED TYPE: ICD-10-CM

## 2024-04-22 DIAGNOSIS — F41.9 ANXIETY: ICD-10-CM

## 2024-04-22 RX ORDER — METOPROLOL SUCCINATE 25 MG/1
25 TABLET, EXTENDED RELEASE ORAL DAILY
Qty: 90 TABLET | Refills: 0 | Status: SHIPPED | OUTPATIENT
Start: 2024-04-22 | End: 2024-07-21

## 2024-04-22 RX ORDER — PAROXETINE HYDROCHLORIDE 20 MG/1
20 TABLET, FILM COATED ORAL DAILY
Qty: 90 TABLET | Refills: 1 | Status: SHIPPED | OUTPATIENT
Start: 2024-04-22 | End: 2024-10-19

## 2024-04-22 RX ORDER — LEVOTHYROXINE SODIUM 75 UG/1
75 TABLET ORAL DAILY
Qty: 90 TABLET | Refills: 1 | Status: SHIPPED | OUTPATIENT
Start: 2024-04-22

## 2024-08-22 ENCOUNTER — APPOINTMENT (OUTPATIENT)
Dept: CARDIOLOGY | Facility: CLINIC | Age: 84
End: 2024-08-22
Payer: MEDICARE

## 2024-09-19 ENCOUNTER — OFFICE VISIT (OUTPATIENT)
Dept: CARDIOLOGY | Facility: CLINIC | Age: 84
End: 2024-09-19
Payer: MEDICARE

## 2024-09-19 VITALS
SYSTOLIC BLOOD PRESSURE: 158 MMHG | WEIGHT: 102 LBS | BODY MASS INDEX: 19.26 KG/M2 | DIASTOLIC BLOOD PRESSURE: 84 MMHG | RESPIRATION RATE: 16 BRPM | HEART RATE: 68 BPM | HEIGHT: 61 IN | OXYGEN SATURATION: 96 %

## 2024-09-19 DIAGNOSIS — I10 PRIMARY HYPERTENSION: ICD-10-CM

## 2024-09-19 PROCEDURE — 99214 OFFICE O/P EST MOD 30 MIN: CPT | Performed by: INTERNAL MEDICINE

## 2024-09-19 PROCEDURE — 3078F DIAST BP <80 MM HG: CPT | Performed by: INTERNAL MEDICINE

## 2024-09-19 PROCEDURE — 1160F RVW MEDS BY RX/DR IN RCRD: CPT | Performed by: INTERNAL MEDICINE

## 2024-09-19 PROCEDURE — 3074F SYST BP LT 130 MM HG: CPT | Performed by: INTERNAL MEDICINE

## 2024-09-19 PROCEDURE — 1159F MED LIST DOCD IN RCRD: CPT | Performed by: INTERNAL MEDICINE

## 2024-09-19 PROCEDURE — 1036F TOBACCO NON-USER: CPT | Performed by: INTERNAL MEDICINE

## 2024-09-19 NOTE — PROGRESS NOTES
Date of Visit: 09/19/2024  3:30 PM EDT  Location of visit: [address]   CARDIOLOGY: Dr Hunt   Chief Complaint:   LORENZA MAO is being seen for a six month follow-up of abnormal test(s) results, coronary artery disease, chest pain, dyslipidemia, hypertension, syncope and a routine medication evaluation.   HPI / Summary:   Lorenza Mao is a 84 y.o. female presents for  a six month Cardiology follow up visit. She denies chest pain, sob, palpitations or pedal edema. Her CT Cardiac Calcium score was 881 in 2017. A NM Stress test was done in 2016. An Echo was done in September last year- see report. Had a MRI brain earlier this month for memory issues.      Medical History:   She has a past medical history of Acute bronchitis, unspecified (11/09/2017), Acute upper respiratory infection, unspecified (10/16/2013), Age-related osteoporosis without current pathological fracture, Body mass index (BMI) 19.9 or less, adult (02/18/2022), Body mass index (BMI) 19.9 or less, adult (07/21/2021), Body mass index (BMI) 19.9 or less, adult (02/12/2021), Encounter for screening mammogram for malignant neoplasm of breast (02/17/2014), Essential (primary) hypertension, Headache, unspecified (06/28/2018), Hypothyroidism, unspecified, Lesion of sciatic nerve, right lower limb (03/14/2016), Other conditions influencing health status, Pain in right hip (10/06/2015), Pain in unspecified foot (06/12/2015), Personal history of other diseases of the musculoskeletal system and connective tissue, Personal history of other diseases of the respiratory system (11/09/2017), Personal history of other endocrine, nutritional and metabolic disease, Personal history of other specified conditions (11/15/2022), Personal history of other specified conditions (09/19/2016), Trochanteric bursitis, right hip (10/06/2015), Unspecified fall, initial encounter (07/21/2021), and Unspecified injury of unspecified foot, initial encounter  "(05/09/2015).  Surgical Hx:   She has a past surgical history that includes Hysterectomy (05/14/2013); Shoulder surgery (05/14/2013); Lumbar laminectomy (05/14/2013); Other surgical history (05/14/2013); Other surgical history (01/04/2021); Colonoscopy (08/06/2015); and Esophagogastroduodenoscopy (05/30/2013).   Social Hx:   She reports that she has never smoked. She has never used smokeless tobacco. She reports that she does not currently use alcohol. She reports that she does not use drugs.  Family Hx:   Her family history is not on file.   Allergies:  Allergies   Allergen Reactions    Doxycycline Diarrhea and Other    Nickel Itching    Adhesive Rash    Latex, Natural Rubber Rash    Penicillins Rash     Outpatient Medications:  Current Outpatient Medications   Medication Instructions    alendronate (FOSAMAX) 70 mg, oral, Every 7 days, Take in the morning with a full glass of water, on an empty stomach, and do not take anything else by mouth or lie down for the next 30 min.    aspirin 81 mg, oral, Daily    biotin 5 mcg, oral, Daily    donepezil (ARICEPT) 5 mg, oral, Nightly    latanoprost (Xalatan) 0.005 % ophthalmic solution 1 drop, Both Eyes, Nightly    levothyroxine (SYNTHROID, LEVOXYL) 75 mcg, oral, Daily, as directed    magnesium oxide 500 mg capsule 1 capsule, oral, Daily    metoprolol succinate XL (TOPROL-XL) 25 mg, oral, Daily, Do not crush or chew.    PARoxetine (PAXIL) 20 mg, oral, Daily    rosuvastatin (CRESTOR) 20 mg, oral, Daily    turmeric root extract 500 mg capsule 1 capsule, oral       Physical Exam:  Vitals:    09/19/24 1527 09/19/24 1529   BP:  127/76   BP Location:  Right arm   Pulse:  73   Resp:  16   SpO2:  96%   Weight: 46.3 kg (102 lb)    Height: 1.549 m (5' 1\")      Wt Readings from Last 5 Encounters:   09/19/24 46.3 kg (102 lb)   02/22/24 46.7 kg (103 lb)   02/05/24 46.7 kg (103 lb)   01/11/24 46.7 kg (103 lb)   11/29/23 46.7 kg (103 lb)     Physical Exam  JVP not elevated. Carotid " impulses are 2+ without overlying bruit.   Chest exhibits fair to good air movement with completely clear breath sounds.   The cardiac rhythm is regular with no premature beats.   Normal S1 and S2. No gallop, murmur or rub, or click.   Abdomen is soft and benign without focal tenderness.   With no lower leg edema. The pedal pulses are intact.     Last Labs:  No visits with results within 6 Month(s) from this visit.   Latest known visit with results is:   Lab on 01/30/2024   Component Date Value    Thyroid Stimulating Horm* 01/30/2024 1.54         Assessment/Plan   1. Coronary artery disease. This patient did have a surveillance nuclear stress test performed on 02/12/2016 which was negative for any evidence of ischemia. Subsequent to that the patient had a CT coronary scales him score of 881 performed on 02/17/2017.  The patient did have an echocardiogram 9/12/2023 demonstrating an LV ejection fraction of 60-65% moderate mitral valve prolapse 1-2+ mitral valve regurgitation 2+ tricuspid valve regurgitation 2+ aortic valve insufficiency and atrial septal aneurysm.  Will need to monitor serial echoes every 2 to 3 years.  She has no concerning anginal symptoms.     2. Hyperlipidemia. Patient did have a lipid panel on 12/20/2019 with cholesterol 208 HDL 99 LDL 93 triglycerides 76. SMA panel was normal as was the TSH. Given the findings of her CT coronary calcium score the patient simvastatin 40 mg daily will be switched to rosuvastatin 20 mg daily. Repeat FLP done 02/2022 showed chol 202, , LDL 79, trig 72. Will increase rosuvastatin to 40 mg daily.  The patient's most recent lipid panel from 10/25/2022 included cholesterol 194  LDL 77 triglycerides 69 this was prior to the increase in dose of rosuvastatin.     3. Hypertension. Appears to be under adequate control with losartan 50 mg daily.  Recent lab work 10/18/2023 included a normal CBC SMA panel.  Systolic blood pressure values slightly elevated today  and will increase metoprolol succinate from 25 mg daily to 50 mg daily.  Lab work to be drawn by her primary care physician.  TSH was 1.54 when checked on 1/30/2024.     4. Arthritis. The patient did have an arthritis panel on 01/14/2020 with sedimentation rate 7 CRP 0.2 and negative rheumatoid factor.     5. Bilateral cataracts.      6. Hx of covid-19 and both vaccines. Had covid-19 in 2020.     7. Presyncope and syncope. Wear Zio patch monitor for 2 weeks and return in six weeks. Zio patch worn September 2021 showed a heart rate of 44-1 84 with an average of 65 bpm. Sinus rhythm. 1 run of VT for 4 beats. 68 SVT runs. The longest lasting 5 beats. Echo done 11/2021 showed EF 60-65%, mild MR, moderate MVP, mild to moderate TR, moderate to severe AI, moderate NC, atrial septal aneurysm. Saw neuro and EEG and carotid came out OK per notes.    8.  Mechanical fall.  This patient had a mechanical fall tripping on a slab of concrete in 9/2023 injuring the chest wall.  She has been seen by neurology and the question of dementia has been raised but clinically not confirmed.  She did have lab work 10/18/2023 including a normal CBC SMA panel sedimentation rate 23 ammonia level 43 B12 679.  MRI of brain 2/5/2024 showed moderate parenchymal volume loss no change from 7/21/2022.  She does have a small 6 to 7 mm meningioma located in a parasagittal frontal lobe convexity.  She also had MR of cervical spine 1/11/2024 showing mild to moderate spinal stenosis mild to moderate neuroforaminal stenoses slightly worse from 7/10/2017.           Orders:  No orders of the defined types were placed in this encounter.     Followup Appts:  Future Appointments   Date Time Provider Department Center   11/13/2024 11:20 AM Andrade Carrillo MD [id number]           ____________________________________________________________  Phill Hunt MD  [facility]  [facility]

## 2024-09-20 RX ORDER — METOPROLOL SUCCINATE 50 MG/1
50 TABLET, EXTENDED RELEASE ORAL DAILY
Qty: 90 TABLET | Refills: 3 | Status: SHIPPED | OUTPATIENT
Start: 2024-09-20 | End: 2025-09-20

## 2024-10-14 DIAGNOSIS — E03.9 HYPOTHYROIDISM (ACQUIRED): ICD-10-CM

## 2024-10-14 DIAGNOSIS — F41.9 ANXIETY: ICD-10-CM

## 2024-10-14 RX ORDER — LEVOTHYROXINE SODIUM 75 UG/1
75 TABLET ORAL DAILY
Qty: 90 TABLET | Refills: 1 | Status: SHIPPED | OUTPATIENT
Start: 2024-10-14

## 2024-10-14 RX ORDER — PAROXETINE HYDROCHLORIDE 20 MG/1
20 TABLET, FILM COATED ORAL DAILY
Qty: 90 TABLET | Refills: 1 | Status: SHIPPED | OUTPATIENT
Start: 2024-10-14 | End: 2025-04-12

## 2024-10-18 ENCOUNTER — LAB (OUTPATIENT)
Dept: LAB | Facility: LAB | Age: 84
End: 2024-10-18
Payer: MEDICARE

## 2024-10-18 DIAGNOSIS — I10 HYPERTENSION, UNSPECIFIED TYPE: ICD-10-CM

## 2024-10-18 DIAGNOSIS — E78.5 DYSLIPIDEMIA: ICD-10-CM

## 2024-10-18 LAB
ANION GAP SERPL CALC-SCNC: 13 MMOL/L (ref 10–20)
BUN SERPL-MCNC: 22 MG/DL (ref 6–23)
CALCIUM SERPL-MCNC: 9.6 MG/DL (ref 8.6–10.6)
CHLORIDE SERPL-SCNC: 102 MMOL/L (ref 98–107)
CHOLEST SERPL-MCNC: 308 MG/DL (ref 0–199)
CHOLESTEROL/HDL RATIO: 2.9
CO2 SERPL-SCNC: 31 MMOL/L (ref 21–32)
CREAT SERPL-MCNC: 0.79 MG/DL (ref 0.5–1.05)
EGFRCR SERPLBLD CKD-EPI 2021: 74 ML/MIN/1.73M*2
GLUCOSE SERPL-MCNC: 87 MG/DL (ref 74–99)
HDLC SERPL-MCNC: 107.5 MG/DL
LDLC SERPL CALC-MCNC: 187 MG/DL
NON HDL CHOLESTEROL: 201 MG/DL (ref 0–149)
POTASSIUM SERPL-SCNC: 4.2 MMOL/L (ref 3.5–5.3)
SODIUM SERPL-SCNC: 142 MMOL/L (ref 136–145)
TRIGL SERPL-MCNC: 69 MG/DL (ref 0–149)
VLDL: 14 MG/DL (ref 0–40)

## 2024-10-18 PROCEDURE — 80061 LIPID PANEL: CPT

## 2024-10-18 PROCEDURE — 36415 COLL VENOUS BLD VENIPUNCTURE: CPT

## 2024-10-18 PROCEDURE — 81003 URINALYSIS AUTO W/O SCOPE: CPT

## 2024-10-18 PROCEDURE — 80048 BASIC METABOLIC PNL TOTAL CA: CPT

## 2024-10-19 LAB
APPEARANCE UR: CLEAR
BILIRUB UR STRIP.AUTO-MCNC: NEGATIVE MG/DL
COLOR UR: YELLOW
GLUCOSE UR STRIP.AUTO-MCNC: NORMAL MG/DL
KETONES UR STRIP.AUTO-MCNC: NEGATIVE MG/DL
LEUKOCYTE ESTERASE UR QL STRIP.AUTO: NEGATIVE
NITRITE UR QL STRIP.AUTO: NEGATIVE
PH UR STRIP.AUTO: 6.5 [PH]
PROT UR STRIP.AUTO-MCNC: NEGATIVE MG/DL
RBC # UR STRIP.AUTO: NEGATIVE /UL
SP GR UR STRIP.AUTO: 1.02
UROBILINOGEN UR STRIP.AUTO-MCNC: NORMAL MG/DL

## 2024-11-13 ENCOUNTER — APPOINTMENT (OUTPATIENT)
Dept: PRIMARY CARE | Facility: CLINIC | Age: 84
End: 2024-11-13
Payer: MEDICARE

## 2024-11-14 ENCOUNTER — OFFICE VISIT (OUTPATIENT)
Dept: PRIMARY CARE | Facility: CLINIC | Age: 84
End: 2024-11-14
Payer: MEDICARE

## 2024-11-14 VITALS
HEIGHT: 61 IN | RESPIRATION RATE: 18 BRPM | BODY MASS INDEX: 19.63 KG/M2 | TEMPERATURE: 97.4 F | HEART RATE: 62 BPM | WEIGHT: 104 LBS | DIASTOLIC BLOOD PRESSURE: 70 MMHG | OXYGEN SATURATION: 99 % | SYSTOLIC BLOOD PRESSURE: 116 MMHG

## 2024-11-14 DIAGNOSIS — E78.2 MIXED HYPERLIPIDEMIA: ICD-10-CM

## 2024-11-14 DIAGNOSIS — Z00.00 ROUTINE MEDICAL EXAM: Primary | ICD-10-CM

## 2024-11-14 DIAGNOSIS — Z12.31 ENCOUNTER FOR SCREENING MAMMOGRAM FOR MALIGNANT NEOPLASM OF BREAST: ICD-10-CM

## 2024-11-14 DIAGNOSIS — I10 HYPERTENSION, UNSPECIFIED TYPE: ICD-10-CM

## 2024-11-14 LAB
BASOPHILS # BLD AUTO: 0.04 X10*3/UL (ref 0–0.1)
BASOPHILS NFR BLD AUTO: 0.6 %
EOSINOPHIL # BLD AUTO: 0.22 X10*3/UL (ref 0–0.4)
EOSINOPHIL NFR BLD AUTO: 3.6 %
ERYTHROCYTE [DISTWIDTH] IN BLOOD BY AUTOMATED COUNT: 13.4 % (ref 11.5–14.5)
HCT VFR BLD AUTO: 41.9 % (ref 36–46)
HGB BLD-MCNC: 13.9 G/DL (ref 12–16)
IMM GRANULOCYTES # BLD AUTO: 0.01 X10*3/UL (ref 0–0.5)
IMM GRANULOCYTES NFR BLD AUTO: 0.2 % (ref 0–0.9)
LYMPHOCYTES # BLD AUTO: 1.06 X10*3/UL (ref 0.8–3)
LYMPHOCYTES NFR BLD AUTO: 17.2 %
MCH RBC QN AUTO: 31.8 PG (ref 26–34)
MCHC RBC AUTO-ENTMCNC: 33.2 G/DL (ref 32–36)
MCV RBC AUTO: 96 FL (ref 80–100)
MONOCYTES # BLD AUTO: 0.59 X10*3/UL (ref 0.05–0.8)
MONOCYTES NFR BLD AUTO: 9.6 %
NEUTROPHILS # BLD AUTO: 4.24 X10*3/UL (ref 1.6–5.5)
NEUTROPHILS NFR BLD AUTO: 68.8 %
NRBC BLD-RTO: 0 /100 WBCS (ref 0–0)
PLATELET # BLD AUTO: 215 X10*3/UL (ref 150–450)
RBC # BLD AUTO: 4.37 X10*6/UL (ref 4–5.2)
TSH SERPL-ACNC: 3.83 MIU/L (ref 0.44–3.98)
WBC # BLD AUTO: 6.2 X10*3/UL (ref 4.4–11.3)

## 2024-11-14 PROCEDURE — 3078F DIAST BP <80 MM HG: CPT | Performed by: FAMILY MEDICINE

## 2024-11-14 PROCEDURE — 1123F ACP DISCUSS/DSCN MKR DOCD: CPT | Performed by: FAMILY MEDICINE

## 2024-11-14 PROCEDURE — 1126F AMNT PAIN NOTED NONE PRSNT: CPT | Performed by: FAMILY MEDICINE

## 2024-11-14 PROCEDURE — 84443 ASSAY THYROID STIM HORMONE: CPT | Performed by: FAMILY MEDICINE

## 2024-11-14 PROCEDURE — 1158F ADVNC CARE PLAN TLK DOCD: CPT | Performed by: FAMILY MEDICINE

## 2024-11-14 PROCEDURE — 99215 OFFICE O/P EST HI 40 MIN: CPT | Performed by: FAMILY MEDICINE

## 2024-11-14 PROCEDURE — 1170F FXNL STATUS ASSESSED: CPT | Performed by: FAMILY MEDICINE

## 2024-11-14 PROCEDURE — 85025 COMPLETE CBC W/AUTO DIFF WBC: CPT | Performed by: FAMILY MEDICINE

## 2024-11-14 PROCEDURE — 99397 PER PM REEVAL EST PAT 65+ YR: CPT | Performed by: FAMILY MEDICINE

## 2024-11-14 PROCEDURE — 36415 COLL VENOUS BLD VENIPUNCTURE: CPT | Performed by: FAMILY MEDICINE

## 2024-11-14 PROCEDURE — 3074F SYST BP LT 130 MM HG: CPT | Performed by: FAMILY MEDICINE

## 2024-11-14 PROCEDURE — 1036F TOBACCO NON-USER: CPT | Performed by: FAMILY MEDICINE

## 2024-11-14 PROCEDURE — 1159F MED LIST DOCD IN RCRD: CPT | Performed by: FAMILY MEDICINE

## 2024-11-14 PROCEDURE — G0439 PPPS, SUBSEQ VISIT: HCPCS | Performed by: FAMILY MEDICINE

## 2024-11-14 RX ORDER — ROSUVASTATIN CALCIUM 40 MG/1
40 TABLET, COATED ORAL DAILY
Qty: 100 TABLET | Refills: 3 | Status: SHIPPED | OUTPATIENT
Start: 2024-11-14 | End: 2025-12-19

## 2024-11-14 ASSESSMENT — PAIN SCALES - GENERAL
PAINLEVEL_OUTOF10: 0 - NO PAIN
PAINLEVEL_OUTOF10: 0-NO PAIN

## 2024-11-14 ASSESSMENT — PATIENT HEALTH QUESTIONNAIRE - PHQ9
SUM OF ALL RESPONSES TO PHQ9 QUESTIONS 1 AND 2: 0
1. LITTLE INTEREST OR PLEASURE IN DOING THINGS: NOT AT ALL
2. FEELING DOWN, DEPRESSED OR HOPELESS: NOT AT ALL

## 2024-11-14 ASSESSMENT — ACTIVITIES OF DAILY LIVING (ADL)
TAKING_MEDICATION: INDEPENDENT
BATHING: INDEPENDENT
GROCERY_SHOPPING: INDEPENDENT
MANAGING_FINANCES: INDEPENDENT
DOING_HOUSEWORK: INDEPENDENT
DRESSING: INDEPENDENT

## 2024-11-14 ASSESSMENT — PAIN - FUNCTIONAL ASSESSMENT: PAIN_FUNCTIONAL_ASSESSMENT: 0-10

## 2024-11-14 ASSESSMENT — ENCOUNTER SYMPTOMS
OCCASIONAL FEELINGS OF UNSTEADINESS: 0
LOSS OF SENSATION IN FEET: 0
DEPRESSION: 0

## 2024-11-14 NOTE — PROGRESS NOTES
"Subjective   Patient ID: Lorenza Mao is a 84 y.o. female who presents for Annual Exam.    HPI     Review of Systems    Objective   /70   Pulse 62   Temp 36.3 °C (97.4 °F)   Resp 18   Ht 1.549 m (5' 1\")   Wt 47.2 kg (104 lb)   SpO2 99%   BMI 19.65 kg/m²     Physical Exam  Vitals and nursing note reviewed.   Constitutional:       General: She is not in acute distress.  HENT:      Right Ear: Tympanic membrane and ear canal normal.      Left Ear: Tympanic membrane and ear canal normal.      Nose: Nose normal. No rhinorrhea.      Mouth/Throat:      Pharynx: Oropharynx is clear. No oropharyngeal exudate or posterior oropharyngeal erythema.      Comments: Dentition wnl  Eyes:      Extraocular Movements: Extraocular movements intact.      Conjunctiva/sclera: Conjunctivae normal.      Pupils: Pupils are equal, round, and reactive to light.   Neck:      Vascular: No carotid bruit.   Cardiovascular:      Rate and Rhythm: Normal rate and regular rhythm.      Heart sounds: Normal heart sounds. No murmur heard.  Pulmonary:      Breath sounds: Normal breath sounds. No wheezing or rhonchi.   Abdominal:      General: Bowel sounds are normal. There is no distension.      Palpations: Abdomen is soft. There is no mass.      Tenderness: There is no abdominal tenderness. There is no guarding or rebound.      Hernia: No hernia is present.   Musculoskeletal:         General: No swelling or tenderness. Normal range of motion.      Cervical back: Normal range of motion and neck supple.   Lymphadenopathy:      Cervical: No cervical adenopathy.   Skin:     General: Skin is warm.      Findings: No rash.   Neurological:      General: No focal deficit present.      Mental Status: She is alert.         Assessment/Plan   Problem List Items Addressed This Visit             ICD-10-CM    Hyperlipidemia E78.5    Relevant Medications    rosuvastatin (Crestor) 40 mg tablet    Other Relevant Orders    CBC and Auto Differential    TSH " with reflex to Free T4 if abnormal    Hypertension I10    Relevant Orders    CBC and Auto Differential    TSH with reflex to Free T4 if abnormal     Other Visit Diagnoses         Codes    Routine medical exam    -  Primary Z00.00    Relevant Orders    CBC and Auto Differential    TSH with reflex to Free T4 if abnormal    Encounter for screening mammogram for malignant neoplasm of breast     Z12.31    Relevant Orders    BI mammo bilateral screening tomosynthesis

## 2024-12-02 ENCOUNTER — APPOINTMENT (OUTPATIENT)
Dept: PRIMARY CARE | Facility: CLINIC | Age: 84
End: 2024-12-02
Payer: MEDICARE

## 2024-12-05 ENCOUNTER — APPOINTMENT (OUTPATIENT)
Dept: PRIMARY CARE | Facility: CLINIC | Age: 84
End: 2024-12-05
Payer: MEDICARE

## 2024-12-05 ENCOUNTER — APPOINTMENT (OUTPATIENT)
Dept: RADIOLOGY | Facility: CLINIC | Age: 84
End: 2024-12-05
Payer: MEDICARE

## 2024-12-12 ENCOUNTER — HOSPITAL ENCOUNTER (OUTPATIENT)
Dept: RADIOLOGY | Facility: CLINIC | Age: 84
Discharge: HOME | End: 2024-12-12
Payer: MEDICARE

## 2024-12-12 VITALS — WEIGHT: 105 LBS | BODY MASS INDEX: 19.84 KG/M2

## 2024-12-12 DIAGNOSIS — Z12.31 ENCOUNTER FOR SCREENING MAMMOGRAM FOR MALIGNANT NEOPLASM OF BREAST: ICD-10-CM

## 2024-12-12 PROCEDURE — 77063 BREAST TOMOSYNTHESIS BI: CPT

## 2025-01-14 DIAGNOSIS — E78.2 MIXED HYPERLIPIDEMIA: ICD-10-CM

## 2025-01-14 DIAGNOSIS — E03.9 HYPOTHYROIDISM (ACQUIRED): ICD-10-CM

## 2025-01-14 RX ORDER — ROSUVASTATIN CALCIUM 40 MG/1
40 TABLET, COATED ORAL DAILY
Qty: 100 TABLET | Refills: 3 | Status: SHIPPED | OUTPATIENT
Start: 2025-01-14 | End: 2026-02-18

## 2025-01-14 RX ORDER — LEVOTHYROXINE SODIUM 75 UG/1
75 TABLET ORAL DAILY
Qty: 90 TABLET | Refills: 1 | Status: SHIPPED | OUTPATIENT
Start: 2025-01-14

## 2025-03-20 ENCOUNTER — OFFICE VISIT (OUTPATIENT)
Dept: CARDIOLOGY | Facility: CLINIC | Age: 85
End: 2025-03-20
Payer: MEDICARE

## 2025-03-20 VITALS
WEIGHT: 103 LBS | HEART RATE: 65 BPM | HEIGHT: 61 IN | SYSTOLIC BLOOD PRESSURE: 136 MMHG | BODY MASS INDEX: 19.45 KG/M2 | OXYGEN SATURATION: 97 % | DIASTOLIC BLOOD PRESSURE: 62 MMHG

## 2025-03-20 DIAGNOSIS — E78.2 MIXED HYPERLIPIDEMIA: ICD-10-CM

## 2025-03-20 PROCEDURE — 3074F SYST BP LT 130 MM HG: CPT | Performed by: INTERNAL MEDICINE

## 2025-03-20 PROCEDURE — 1123F ACP DISCUSS/DSCN MKR DOCD: CPT | Performed by: INTERNAL MEDICINE

## 2025-03-20 PROCEDURE — 1160F RVW MEDS BY RX/DR IN RCRD: CPT | Performed by: INTERNAL MEDICINE

## 2025-03-20 PROCEDURE — 3078F DIAST BP <80 MM HG: CPT | Performed by: INTERNAL MEDICINE

## 2025-03-20 PROCEDURE — 99213 OFFICE O/P EST LOW 20 MIN: CPT | Performed by: INTERNAL MEDICINE

## 2025-03-20 PROCEDURE — G2211 COMPLEX E/M VISIT ADD ON: HCPCS | Performed by: INTERNAL MEDICINE

## 2025-03-20 PROCEDURE — 1159F MED LIST DOCD IN RCRD: CPT | Performed by: INTERNAL MEDICINE

## 2025-03-20 ASSESSMENT — ENCOUNTER SYMPTOMS: DEPRESSION: 0

## 2025-03-20 ASSESSMENT — PATIENT HEALTH QUESTIONNAIRE - PHQ9
SUM OF ALL RESPONSES TO PHQ9 QUESTIONS 1 AND 2: 0
2. FEELING DOWN, DEPRESSED OR HOPELESS: NOT AT ALL
1. LITTLE INTEREST OR PLEASURE IN DOING THINGS: NOT AT ALL

## 2025-03-20 NOTE — PROGRESS NOTES
Date of Visit: 03/20/2025  2:00 PM EDT  Location of visit: [address]   CARDIOLOGY: Dr Hunt   Chief Complaint:   LORENZA MAO is being seen for a six month follow-up of abnormal test(s) results, coronary artery disease, chest pain, dyslipidemia, hypertension, syncope and a routine medication evaluation.   HPI / Summary:   Lorenza Mao is a 84 y.o. female presents for  a six month Cardiology follow up visit. She denies chest pain, sob, palpitations or pedal edema. Her CT Cardiac Calcium score was 881 in 2017. A NM Stress test was done in 2016. An Echo was done in September last year- see report. Had a MRI brain earlier this month for memory issues.      Medical History:   She has a past medical history of Acute bronchitis, unspecified (11/09/2017), Acute upper respiratory infection, unspecified (10/16/2013), Age-related osteoporosis without current pathological fracture, Body mass index (BMI) 19.9 or less, adult (02/18/2022), Body mass index (BMI) 19.9 or less, adult (07/21/2021), Body mass index (BMI) 19.9 or less, adult (02/12/2021), Encounter for screening mammogram for malignant neoplasm of breast (02/17/2014), Essential (primary) hypertension, Headache, unspecified (06/28/2018), Hypothyroidism, unspecified, Lesion of sciatic nerve, right lower limb (03/14/2016), Other conditions influencing health status, Pain in right hip (10/06/2015), Pain in unspecified foot (06/12/2015), Personal history of other diseases of the musculoskeletal system and connective tissue, Personal history of other diseases of the respiratory system (11/09/2017), Personal history of other endocrine, nutritional and metabolic disease, Personal history of other specified conditions (11/15/2022), Personal history of other specified conditions (09/19/2016), Trochanteric bursitis, right hip (10/06/2015), Unspecified fall, initial encounter (07/21/2021), and Unspecified injury of unspecified foot, initial encounter  "(05/09/2015).  Surgical Hx:   She has a past surgical history that includes Hysterectomy (05/14/2013); Shoulder surgery (05/14/2013); Lumbar laminectomy (05/14/2013); Other surgical history (05/14/2013); Other surgical history (01/04/2021); Colonoscopy (08/06/2015); and Esophagogastroduodenoscopy (05/30/2013).   Social Hx:   She reports that she has never smoked. She has never used smokeless tobacco. She reports that she does not currently use alcohol. She reports that she does not use drugs.  Family Hx:   Her family history includes Breast cancer in her mother.   Allergies:  Allergies   Allergen Reactions   • Doxycycline Diarrhea and Other   • Nickel Itching   • Adhesive Rash   • Latex, Natural Rubber Rash   • Penicillins Rash     Outpatient Medications:  Current Outpatient Medications   Medication Instructions   • alendronate (FOSAMAX) 70 mg, oral, Every 7 days, Take in the morning with a full glass of water, on an empty stomach, and do not take anything else by mouth or lie down for the next 30 min.   • aspirin 81 mg, Daily   • biotin 5 mcg, Daily   • donepezil (ARICEPT) 5 mg, Nightly   • latanoprost (Xalatan) 0.005 % ophthalmic solution 1 drop, Nightly   • levothyroxine (SYNTHROID, LEVOXYL) 75 mcg, oral, Daily, as directed   • magnesium oxide 500 mg capsule 1 capsule, Daily   • metoprolol succinate XL (TOPROL-XL) 50 mg, oral, Daily, Do not crush or chew.   • PARoxetine (PAXIL) 20 mg, oral, Daily   • rosuvastatin (CRESTOR) 40 mg, oral, Daily   • turmeric root extract 500 mg capsule 1 capsule       Physical Exam:  Vitals:    03/20/25 1355   BP: 129/71   Pulse: 65   SpO2: 97%   Weight: 46.7 kg (103 lb)   Height: 1.549 m (5' 1\")     Wt Readings from Last 5 Encounters:   03/20/25 46.7 kg (103 lb)   12/12/24 47.6 kg (105 lb)   11/14/24 47.2 kg (104 lb)   09/19/24 46.3 kg (102 lb)   02/22/24 46.7 kg (103 lb)     Physical Exam  JVP not elevated. Carotid impulses are 2+ without overlying bruit.   Chest exhibits fair to " good air movement with completely clear breath sounds.   The cardiac rhythm is regular with no premature beats.   Normal S1 and S2. No gallop, murmur or rub, or click.   Abdomen is soft and benign without focal tenderness.   With no lower leg edema. The pedal pulses are intact.     Last Labs:  Office Visit on 11/14/2024   Component Date Value   • WBC 11/14/2024 6.2    • nRBC 11/14/2024 0.0    • RBC 11/14/2024 4.37    • Hemoglobin 11/14/2024 13.9    • Hematocrit 11/14/2024 41.9    • MCV 11/14/2024 96    • MCH 11/14/2024 31.8    • MCHC 11/14/2024 33.2    • RDW 11/14/2024 13.4    • Platelets 11/14/2024 215    • Neutrophils % 11/14/2024 68.8    • Immature Granulocytes %,* 11/14/2024 0.2    • Lymphocytes % 11/14/2024 17.2    • Monocytes % 11/14/2024 9.6    • Eosinophils % 11/14/2024 3.6    • Basophils % 11/14/2024 0.6    • Neutrophils Absolute 11/14/2024 4.24    • Immature Granulocytes Ab* 11/14/2024 0.01    • Lymphocytes Absolute 11/14/2024 1.06    • Monocytes Absolute 11/14/2024 0.59    • Eosinophils Absolute 11/14/2024 0.22    • Basophils Absolute 11/14/2024 0.04    • Thyroid Stimulating Horm* 11/14/2024 3.83    Lab on 10/18/2024   Component Date Value   • Cholesterol 10/18/2024 308 (H)    • HDL-Cholesterol 10/18/2024 107.5    • Cholesterol/HDL Ratio 10/18/2024 2.9    • LDL Calculated 10/18/2024 187 (H)    • VLDL 10/18/2024 14    • Triglycerides 10/18/2024 69    • Non HDL Cholesterol 10/18/2024 201 (H)    • Glucose 10/18/2024 87    • Sodium 10/18/2024 142    • Potassium 10/18/2024 4.2    • Chloride 10/18/2024 102    • Bicarbonate 10/18/2024 31    • Anion Gap 10/18/2024 13    • Urea Nitrogen 10/18/2024 22    • Creatinine 10/18/2024 0.79    • eGFR 10/18/2024 74    • Calcium 10/18/2024 9.6    • Color, Urine 10/18/2024 Yellow    • Appearance, Urine 10/18/2024 Clear    • Specific Gravity, Urine 10/18/2024 1.019    • pH, Urine 10/18/2024 6.5    • Protein, Urine 10/18/2024 NEGATIVE    • Glucose, Urine 10/18/2024 Normal    •  Blood, Urine 10/18/2024 NEGATIVE    • Ketones, Urine 10/18/2024 NEGATIVE    • Bilirubin, Urine 10/18/2024 NEGATIVE    • Urobilinogen, Urine 10/18/2024 Normal    • Nitrite, Urine 10/18/2024 NEGATIVE    • Leukocyte Esterase, Urine 10/18/2024 NEGATIVE         Assessment/Plan   1. Coronary artery disease. This patient did have a surveillance nuclear stress test performed on 02/12/2016 which was negative for any evidence of ischemia. Subsequent to that the patient had a CT coronary scales him score of 881 performed on 02/17/2017.  The patient did have an echocardiogram 9/12/2023 demonstrating an LV ejection fraction of 60-65% moderate mitral valve prolapse 1-2+ mitral valve regurgitation 2+ tricuspid valve regurgitation 2+ aortic valve insufficiency and atrial septal aneurysm.  Will need to monitor serial echoes every 2 to 3 years.  She has no concerning anginal symptoms.     2. Hyperlipidemia. Patient did have a lipid panel on 12/20/2019 with cholesterol 208 HDL 99 LDL 93 triglycerides 76. SMA panel was normal as was the TSH. Given the findings of her CT coronary calcium score the patient simvastatin 40 mg daily will be switched to rosuvastatin 20 mg daily. Repeat FLP done 02/2022 showed chol 202, , LDL 79, trig 72. Will increase rosuvastatin to 40 mg daily.  The patient's most recent lipid panel from 10/25/2022 included cholesterol 194  LDL 77 triglycerides 69 this was prior to the increase in dose of rosuvastatin.     3. Hypertension. Appears to be under adequate control with losartan 50 mg daily.  Recent lab work 10/18/2023 included a normal CBC SMA panel.  Systolic blood pressure values slightly elevated today and will increase metoprolol succinate from 25 mg daily to 50 mg daily.  Lab work to be drawn by her primary care physician.  TSH was 1.54 when checked on 1/30/2024.     4. Arthritis. The patient did have an arthritis panel on 01/14/2020 with sedimentation rate 7 CRP 0.2 and negative rheumatoid  factor.     5. Bilateral cataracts.      6. Hx of covid-19 and both vaccines. Had covid-19 in 2020.     7. Presyncope and syncope. Wear Zio patch monitor for 2 weeks and return in six weeks. Zio patch worn September 2021 showed a heart rate of 44-1 84 with an average of 65 bpm. Sinus rhythm. 1 run of VT for 4 beats. 68 SVT runs. The longest lasting 5 beats. Echo done 11/2021 showed EF 60-65%, mild MR, moderate MVP, mild to moderate TR, moderate to severe AI, moderate MO, atrial septal aneurysm. Saw neuro and EEG and carotid came out OK per notes.    8.  Mechanical fall.  This patient had a mechanical fall tripping on a slab of concrete in 9/2023 injuring the chest wall.  She has been seen by neurology and the question of dementia has been raised but clinically not confirmed.  She did have lab work 10/18/2023 including a normal CBC SMA panel sedimentation rate 23 ammonia level 43 B12 679.  MRI of brain 2/5/2024 showed moderate parenchymal volume loss no change from 7/21/2022.  She does have a small 6 to 7 mm meningioma located in a parasagittal frontal lobe convexity.  She also had MR of cervical spine 1/11/2024 showing mild to moderate spinal stenosis mild to moderate neuroforaminal stenoses slightly worse from 7/10/2017.           Orders:  No orders of the defined types were placed in this encounter.     Followup Appts:  Future Appointments   Date Time Provider Department Center   5/15/2025  9:15 AM Demario Sharma,  [id number] [facility]           ____________________________________________________________  Phill Hunt MD  [facility]  [facility]     ____________________________________________________________  Phill Hunt MD  [facility]

## 2025-05-15 ENCOUNTER — OFFICE VISIT (OUTPATIENT)
Dept: PRIMARY CARE | Facility: CLINIC | Age: 85
End: 2025-05-15
Payer: MEDICARE

## 2025-05-15 VITALS
RESPIRATION RATE: 18 BRPM | SYSTOLIC BLOOD PRESSURE: 122 MMHG | TEMPERATURE: 97.1 F | BODY MASS INDEX: 19.71 KG/M2 | HEIGHT: 61 IN | DIASTOLIC BLOOD PRESSURE: 62 MMHG | OXYGEN SATURATION: 95 % | WEIGHT: 104.4 LBS | HEART RATE: 61 BPM

## 2025-05-15 DIAGNOSIS — E03.9 HYPOTHYROIDISM, UNSPECIFIED TYPE: Primary | ICD-10-CM

## 2025-05-15 PROBLEM — M71.21 SYNOVIAL CYST OF RIGHT KNEE: Status: ACTIVE | Noted: 2025-05-15

## 2025-05-15 PROBLEM — M18.11 ARTHRITIS OF CARPOMETACARPAL (CMC) JOINT OF RIGHT THUMB: Status: ACTIVE | Noted: 2018-11-01

## 2025-05-15 PROBLEM — D18.00 CAVERNOUS HEMANGIOMA: Status: ACTIVE | Noted: 2025-05-15

## 2025-05-15 PROCEDURE — 99213 OFFICE O/P EST LOW 20 MIN: CPT | Performed by: FAMILY MEDICINE

## 2025-05-15 PROCEDURE — 1036F TOBACCO NON-USER: CPT | Performed by: FAMILY MEDICINE

## 2025-05-15 PROCEDURE — 1159F MED LIST DOCD IN RCRD: CPT | Performed by: FAMILY MEDICINE

## 2025-05-15 PROCEDURE — 1126F AMNT PAIN NOTED NONE PRSNT: CPT | Performed by: FAMILY MEDICINE

## 2025-05-15 PROCEDURE — 3074F SYST BP LT 130 MM HG: CPT | Performed by: FAMILY MEDICINE

## 2025-05-15 PROCEDURE — 3078F DIAST BP <80 MM HG: CPT | Performed by: FAMILY MEDICINE

## 2025-05-15 ASSESSMENT — PAIN SCALES - GENERAL: PAINLEVEL_OUTOF10: 0-NO PAIN

## 2025-05-15 NOTE — PROGRESS NOTES
"Subjective   Patient ID: Lorenza Mao is a 85 y.o. female who presents for Annual Exam.    HPI pt here for general 6 month check     Review of Systems    Objective   /62   Pulse 61   Temp 36.2 °C (97.1 °F)   Resp 18   Ht 1.549 m (5' 1\")   Wt 47.4 kg (104 lb 6.4 oz)   SpO2 95%   BMI 19.73 kg/m²     Physical Exam  Constitutional:       General: She is not in acute distress.     Appearance: Normal appearance.   Cardiovascular:      Rate and Rhythm: Normal rate and regular rhythm.      Heart sounds: No murmur heard.  Pulmonary:      Breath sounds: Normal breath sounds. No wheezing.   Neurological:      Mental Status: She is alert.         Assessment/Plan   Problem List Items Addressed This Visit    None  Visit Diagnoses         Codes      Hypothyroidism, unspecified type    -  Primary E03.9    Relevant Orders    TSH with reflex to Free T4 if abnormal               "

## 2025-05-16 LAB — TSH SERPL-ACNC: 2.33 MIU/L (ref 0.4–4.5)

## 2025-06-03 DIAGNOSIS — D32.9 MENINGIOMA (MULTI): ICD-10-CM
